# Patient Record
Sex: FEMALE | Race: WHITE
[De-identification: names, ages, dates, MRNs, and addresses within clinical notes are randomized per-mention and may not be internally consistent; named-entity substitution may affect disease eponyms.]

---

## 2019-06-12 ENCOUNTER — HOSPITAL ENCOUNTER (OUTPATIENT)
Dept: HOSPITAL 74 - FASU | Age: 39
Discharge: HOME | End: 2019-06-12
Payer: COMMERCIAL

## 2019-08-14 ENCOUNTER — HOSPITAL ENCOUNTER (OUTPATIENT)
Dept: HOSPITAL 74 - FASU-ENDO | Age: 39
Discharge: HOME | End: 2019-08-14
Attending: INTERNAL MEDICINE
Payer: COMMERCIAL

## 2019-08-14 VITALS — DIASTOLIC BLOOD PRESSURE: 81 MMHG | HEART RATE: 79 BPM | SYSTOLIC BLOOD PRESSURE: 123 MMHG

## 2019-08-14 VITALS — BODY MASS INDEX: 22.6 KG/M2

## 2019-08-14 VITALS — TEMPERATURE: 98.3 F

## 2019-08-14 DIAGNOSIS — K22.70: Primary | ICD-10-CM

## 2019-08-14 DIAGNOSIS — K29.50: ICD-10-CM

## 2019-08-14 DIAGNOSIS — K21.0: ICD-10-CM

## 2019-08-14 PROCEDURE — 0DB98ZX EXCISION OF DUODENUM, VIA NATURAL OR ARTIFICIAL OPENING ENDOSCOPIC, DIAGNOSTIC: ICD-10-PCS | Performed by: INTERNAL MEDICINE

## 2019-08-14 PROCEDURE — 0DB48ZX EXCISION OF ESOPHAGOGASTRIC JUNCTION, VIA NATURAL OR ARTIFICIAL OPENING ENDOSCOPIC, DIAGNOSTIC: ICD-10-PCS | Performed by: INTERNAL MEDICINE

## 2019-08-14 PROCEDURE — 0DB68ZX EXCISION OF STOMACH, VIA NATURAL OR ARTIFICIAL OPENING ENDOSCOPIC, DIAGNOSTIC: ICD-10-PCS | Performed by: INTERNAL MEDICINE

## 2019-08-16 NOTE — PATH
Surgical Pathology Report



Patient Name:  MARIELOS WHYTE

Accession #:  [id number]

Med. Rec. #:  [id number]                                                        

   /Age/Gender:  1980 (Age: 38) / F

Account:  [id number]                                                          

             Location: [facility]

Taken:  2019

Received:  2019

Reported:  2019

Physicians:  Yumiko Loaiza M.D.

  



Specimen(s) Received

A: SECOND PORTION DUODENUM 

B: ANTRUM 

C: GE JUNCTION 





Clinical History

History of Walden's esophagus







Final Diagnosis

A. SECOND PORTION OF DUODENUM, BIOPSY:

DUODENUM MUCOSA WITH NO SIGNIFICANT PATHOLOGIC CHANGE.

NO HISTOLOGIC EVIDENCE OF CELIAC DISEASE.



B. ANTRUM, BIOPSY:

GASTRIC MUCOSA WITH CHRONIC GASTRITIS.

IMMUNOSTAIN FOR H. PYLORI IS NEGATIVE.

NEGATIVE FOR INTESTINAL METAPLASIA.



C. GE JUNCTION, BIOPSY

SQUAMOUS AND GASTRIC MUCOSA WITH MILD ACUTE AND CHRONIC INFLAMMATION, AND

CHANGES CONSISTENT WITH REFLUX ESOPHAGITIS.

NEGATIVE FOR INTESTINAL METAPLASIA.







***Electronically Signed***

Jose F Perez M.D.





Gross Description

A.  Received in formalin, labeled "biopsy second portion of duodenum" is a tan,

irregular portion of soft tissue measuring 0.3 cm. in greatest dimension. The

specimen is submitted in toto in one cassette.



B.  Received in formalin, labeled "biopsy gastric antrum" is a tan, irregular

portion of soft tissue measuring 0.4 cm. in greatest dimension. The specimen is

submitted in toto in one cassette.



C.  Received in formalin, labeled "biopsy GE junction" are 5 tan, irregular

portions of soft tissue ranging from 0.3-0.7 cm. in greatest dimension. The

specimens are submitted in toto in one cassette.

/8/15/2019



[facility]/8/15/2019

## 2021-02-03 ENCOUNTER — HOSPITAL ENCOUNTER (OUTPATIENT)
Dept: HOSPITAL 74 - FASU-ENDO | Age: 41
Discharge: HOME | End: 2021-02-03
Attending: INTERNAL MEDICINE
Payer: COMMERCIAL

## 2021-02-03 VITALS — HEART RATE: 76 BPM | DIASTOLIC BLOOD PRESSURE: 92 MMHG | SYSTOLIC BLOOD PRESSURE: 126 MMHG

## 2021-02-03 VITALS — BODY MASS INDEX: 21.4 KG/M2

## 2021-02-03 VITALS — TEMPERATURE: 98.2 F

## 2021-02-03 DIAGNOSIS — K22.70: Primary | ICD-10-CM

## 2021-02-03 DIAGNOSIS — K31.89: ICD-10-CM

## 2021-02-03 DIAGNOSIS — K22.9: ICD-10-CM

## 2021-02-03 DIAGNOSIS — Z09: ICD-10-CM

## 2021-02-03 DIAGNOSIS — K29.70: ICD-10-CM

## 2021-02-03 PROCEDURE — 0DB78ZX EXCISION OF STOMACH, PYLORUS, VIA NATURAL OR ARTIFICIAL OPENING ENDOSCOPIC, DIAGNOSTIC: ICD-10-PCS | Performed by: INTERNAL MEDICINE

## 2021-02-03 PROCEDURE — 0DB48ZX EXCISION OF ESOPHAGOGASTRIC JUNCTION, VIA NATURAL OR ARTIFICIAL OPENING ENDOSCOPIC, DIAGNOSTIC: ICD-10-PCS | Performed by: INTERNAL MEDICINE

## 2022-03-16 ENCOUNTER — HOSPITAL ENCOUNTER (OUTPATIENT)
Dept: HOSPITAL 74 - FASU-ENDO | Age: 42
Discharge: HOME | End: 2022-03-16
Attending: INTERNAL MEDICINE
Payer: COMMERCIAL

## 2022-03-16 VITALS — DIASTOLIC BLOOD PRESSURE: 71 MMHG | SYSTOLIC BLOOD PRESSURE: 129 MMHG | HEART RATE: 79 BPM

## 2022-03-16 VITALS — BODY MASS INDEX: 22.2 KG/M2

## 2022-03-16 VITALS — TEMPERATURE: 97.8 F

## 2022-03-16 DIAGNOSIS — Z13.810: Primary | ICD-10-CM

## 2022-03-16 DIAGNOSIS — K21.00: ICD-10-CM

## 2022-03-16 DIAGNOSIS — K29.50: ICD-10-CM

## 2022-03-16 DIAGNOSIS — Z86.19: ICD-10-CM

## 2022-03-16 PROCEDURE — 0DB38ZX EXCISION OF LOWER ESOPHAGUS, VIA NATURAL OR ARTIFICIAL OPENING ENDOSCOPIC, DIAGNOSTIC: ICD-10-PCS | Performed by: INTERNAL MEDICINE

## 2022-03-16 PROCEDURE — 0DB98ZX EXCISION OF DUODENUM, VIA NATURAL OR ARTIFICIAL OPENING ENDOSCOPIC, DIAGNOSTIC: ICD-10-PCS | Performed by: INTERNAL MEDICINE

## 2022-03-16 PROCEDURE — 0DB48ZX EXCISION OF ESOPHAGOGASTRIC JUNCTION, VIA NATURAL OR ARTIFICIAL OPENING ENDOSCOPIC, DIAGNOSTIC: ICD-10-PCS | Performed by: INTERNAL MEDICINE

## 2022-03-16 PROCEDURE — 0DB18ZX EXCISION OF UPPER ESOPHAGUS, VIA NATURAL OR ARTIFICIAL OPENING ENDOSCOPIC, DIAGNOSTIC: ICD-10-PCS | Performed by: INTERNAL MEDICINE

## 2022-03-16 PROCEDURE — 0DB68ZX EXCISION OF STOMACH, VIA NATURAL OR ARTIFICIAL OPENING ENDOSCOPIC, DIAGNOSTIC: ICD-10-PCS | Performed by: INTERNAL MEDICINE

## 2022-03-16 PROCEDURE — 0DB28ZX EXCISION OF MIDDLE ESOPHAGUS, VIA NATURAL OR ARTIFICIAL OPENING ENDOSCOPIC, DIAGNOSTIC: ICD-10-PCS | Performed by: INTERNAL MEDICINE

## 2022-11-29 ENCOUNTER — HOSPITAL ENCOUNTER (OUTPATIENT)
Dept: HOSPITAL 74 - JASU-ENDO | Age: 42
Discharge: HOME | End: 2022-11-29
Attending: STUDENT IN AN ORGANIZED HEALTH CARE EDUCATION/TRAINING PROGRAM
Payer: COMMERCIAL

## 2022-11-29 VITALS
HEART RATE: 81 BPM | TEMPERATURE: 97.8 F | DIASTOLIC BLOOD PRESSURE: 83 MMHG | SYSTOLIC BLOOD PRESSURE: 118 MMHG | RESPIRATION RATE: 14 BRPM

## 2022-11-29 VITALS — BODY MASS INDEX: 22.4 KG/M2

## 2022-11-29 DIAGNOSIS — K64.8: ICD-10-CM

## 2022-11-29 DIAGNOSIS — K63.5: Primary | ICD-10-CM

## 2022-11-29 DIAGNOSIS — K64.1: ICD-10-CM

## 2022-11-29 PROCEDURE — 0DBN8ZX EXCISION OF SIGMOID COLON, VIA NATURAL OR ARTIFICIAL OPENING ENDOSCOPIC, DIAGNOSTIC: ICD-10-PCS | Performed by: STUDENT IN AN ORGANIZED HEALTH CARE EDUCATION/TRAINING PROGRAM

## 2023-01-03 ENCOUNTER — HOSPITAL ENCOUNTER (OUTPATIENT)
Dept: HOSPITAL 74 - JASU-ENDO | Age: 43
Discharge: HOME | End: 2023-01-03
Attending: STUDENT IN AN ORGANIZED HEALTH CARE EDUCATION/TRAINING PROGRAM
Payer: COMMERCIAL

## 2023-01-03 VITALS — BODY MASS INDEX: 22.4 KG/M2

## 2023-01-03 VITALS — SYSTOLIC BLOOD PRESSURE: 126 MMHG | RESPIRATION RATE: 15 BRPM | HEART RATE: 70 BPM | DIASTOLIC BLOOD PRESSURE: 87 MMHG

## 2023-01-03 VITALS — TEMPERATURE: 98 F

## 2023-01-03 DIAGNOSIS — D50.9: ICD-10-CM

## 2023-01-03 DIAGNOSIS — K29.50: Primary | ICD-10-CM

## 2023-01-03 PROCEDURE — 0DBA8ZX EXCISION OF JEJUNUM, VIA NATURAL OR ARTIFICIAL OPENING ENDOSCOPIC, DIAGNOSTIC: ICD-10-PCS | Performed by: STUDENT IN AN ORGANIZED HEALTH CARE EDUCATION/TRAINING PROGRAM

## 2023-01-03 PROCEDURE — 0DB98ZX EXCISION OF DUODENUM, VIA NATURAL OR ARTIFICIAL OPENING ENDOSCOPIC, DIAGNOSTIC: ICD-10-PCS | Performed by: STUDENT IN AN ORGANIZED HEALTH CARE EDUCATION/TRAINING PROGRAM

## 2023-01-03 PROCEDURE — 0DB68ZX EXCISION OF STOMACH, VIA NATURAL OR ARTIFICIAL OPENING ENDOSCOPIC, DIAGNOSTIC: ICD-10-PCS | Performed by: STUDENT IN AN ORGANIZED HEALTH CARE EDUCATION/TRAINING PROGRAM

## 2023-01-03 PROCEDURE — 0DB78ZX EXCISION OF STOMACH, PYLORUS, VIA NATURAL OR ARTIFICIAL OPENING ENDOSCOPIC, DIAGNOSTIC: ICD-10-PCS | Performed by: STUDENT IN AN ORGANIZED HEALTH CARE EDUCATION/TRAINING PROGRAM
